# Patient Record
Sex: MALE | Race: BLACK OR AFRICAN AMERICAN | Employment: UNEMPLOYED | URBAN - METROPOLITAN AREA
[De-identification: names, ages, dates, MRNs, and addresses within clinical notes are randomized per-mention and may not be internally consistent; named-entity substitution may affect disease eponyms.]

---

## 2017-06-10 ENCOUNTER — HOSPITAL ENCOUNTER (EMERGENCY)
Age: 27
Discharge: HOME | End: 2017-06-10
Attending: EMERGENCY MEDICINE
Payer: SELF-PAY

## 2017-06-10 ENCOUNTER — APPOINTMENT (OUTPATIENT)
Dept: GENERAL RADIOLOGY | Age: 27
End: 2017-06-10
Attending: EMERGENCY MEDICINE
Payer: SELF-PAY

## 2017-06-10 VITALS
DIASTOLIC BLOOD PRESSURE: 79 MMHG | HEIGHT: 66 IN | BODY MASS INDEX: 22.5 KG/M2 | SYSTOLIC BLOOD PRESSURE: 110 MMHG | OXYGEN SATURATION: 100 % | RESPIRATION RATE: 16 BRPM | WEIGHT: 140 LBS | TEMPERATURE: 99.3 F | HEART RATE: 97 BPM

## 2017-06-10 DIAGNOSIS — S93.402A SPRAIN OF LEFT ANKLE, UNSPECIFIED LIGAMENT, INITIAL ENCOUNTER: Primary | ICD-10-CM

## 2017-06-10 PROCEDURE — 99283 EMERGENCY DEPT VISIT LOW MDM: CPT | Performed by: PHYSICIAN ASSISTANT

## 2017-06-10 PROCEDURE — 73610 X-RAY EXAM OF ANKLE: CPT

## 2017-06-10 PROCEDURE — 75810000053 HC SPLINT APPLICATION: Performed by: PHYSICIAN ASSISTANT

## 2017-06-10 PROCEDURE — L4350 ANKLE CONTROL ORTHO PRE OTS: HCPCS

## 2017-06-10 RX ORDER — IBUPROFEN 600 MG/1
600 TABLET ORAL
Qty: 20 TAB | Refills: 0 | Status: SHIPPED | OUTPATIENT
Start: 2017-06-10

## 2017-06-10 NOTE — Clinical Note
Emergency Department care is no substitute for quality primary care. The Emergency Department cannot adequately manage chronic health problems such as hypertension, arthritis, chronic pain, lung disease, heart disease, diabetes, etc. We cannot be expecte d to screen for all possible illnesses in a single visit. It is vital, regardless of your condition, that you have regular, routine health care. If your problem does not improve as expected and you are not able to find follow up care, the Emergency D epartment is always available to re-evaluate your condition. However, Emergency Department physicians will NOT refill pain medications, sedatives, or anxiety medications. You need a family doctor, it will be your responsibility to contact the physici ildefonso's office and arrange an appointment. Or you may call [phone], extension [phone] and ask for assistance finding a local doctor. This physician might not participate in your insurance plan. Resources include these local clinics:  
 
[facility] [phone] [address] [facility] [phone] Estevan Augustine [place] [address] [facility] [place] [phone] [employer] [phone] [facility]  [phone] Rule for [address] [place] [address] [phone] Medicaid Office [phone] [facility] [phone] [facility]1 [facility] [phone] [employer] [phone] . Saeid Larsen [address] Moreira [phone] [facility]6 [facility] [phone] [phone] [facility] CHEMICAL DEPENDENCY RECOVERY [facility]) [phone] 1606 [facility] [phone] [facility] [phone] 4500 [facility] [phone] Yasmeen [place] [address] [phone] 6501 [facility] [phone]

## 2017-06-10 NOTE — ED TRIAGE NOTES
Pt arrives complaining of left ankle pain after a fall while playing basketball. States he thinks it's sprained. Pt alert and oriented in triage.

## 2017-06-10 NOTE — LETTER
NOTIFICATION RETURN TO WORK / SCHOOL 
 
6/10/2017 8:38 PM 
 
Mr. Suzie Marsahll [address] [facility] [address]sh[address] [phone] To Whom It May Concern: 
 
Suzie Marshall is currently under the care of [facility] EMERGENCY DEPT.  
 
 
 
 
 
Sincerely,

## 2017-06-11 NOTE — ED NOTES
Air cast applied to left foot. Patient tolerated procedure well and demonstrated how to add and remove air from splint. Patient provided crutches with instructions on how to use. Patient states \"that he feels much better now. \"

## 2017-06-11 NOTE — ED NOTES
I have reviewed discharge instructions with the patient. The patient verbalized understanding. The patient is ambulatory upon exit with splint applied and crutches being used correctly. The patient has discharge instructions and prescription in hand.

## 2017-06-11 NOTE — ED PROVIDER NOTES
HPI Comments: 82-year-old -American male presents ambulatory stating that 2-3 hours ago he was playing basketball jumped up and landed twisting his left ankle. He states he is able to bear weight but it is painful. He denies any paresthesias, weakness or loss of function. Patient is a 27 y.o. male presenting with ankle pain. The history is provided by the patient. Ankle Pain    This is a new problem. The current episode started 1 to 2 hours ago. The problem occurs constantly. The problem has not changed since onset. The pain is present in the left ankle. The pain is at a severity of 10/10. Associated symptoms include limited range of motion and stiffness. Pertinent negatives include no numbness, no tingling and no back pain. The symptoms are aggravated by movement, palpation and activity. He has tried nothing for the symptoms. There has been no history of extremity trauma. History reviewed. No pertinent past medical history. History reviewed. No pertinent surgical history. History reviewed. No pertinent family history. Social History     Social History    Marital status: SINGLE     Spouse name: N/A    Number of children: N/A    Years of education: N/A     Occupational History    Not on file. Social History Main Topics    Smoking status: Not on file    Smokeless tobacco: Not on file    Alcohol use Not on file    Drug use: Not on file    Sexual activity: Not on file     Other Topics Concern    Not on file     Social History Narrative    No narrative on file         ALLERGIES: Review of patient's allergies indicates no known allergies. Review of Systems   Constitutional: Negative for chills, diaphoresis, fatigue and fever. Gastrointestinal: Negative for nausea and vomiting. Musculoskeletal: Positive for arthralgias, gait problem, joint swelling and stiffness. Negative for back pain and myalgias. Neurological: Negative for tingling and numbness.    All other systems reviewed and are negative. Vitals:    06/10/17 1901   BP: 110/79   Pulse: 97   Resp: 16   Temp: 99.3 °F (37.4 °C)   SpO2: 100%   Weight: 63.5 kg (140 lb)   Height: 5' 6\" (1.676 m)            Physical Exam   Constitutional: He is oriented to person, place, and time. Vital signs are normal. He appears well-developed and well-nourished. He is active. Non-toxic appearance. He does not appear ill. No distress. Cardiovascular: Normal rate, regular rhythm and normal heart sounds. Exam reveals no gallop and no friction rub. No murmur heard. Pulses:       Dorsalis pedis pulses are 2+ on the left side. Posterior tibial pulses are 2+ on the left side. Pulmonary/Chest: Effort normal and breath sounds normal. No accessory muscle usage. No respiratory distress. He has no decreased breath sounds. He has no wheezes. He has no rhonchi. Musculoskeletal:        Left knee: Normal. He exhibits normal range of motion. No tenderness found. Left ankle: He exhibits decreased range of motion and swelling. He exhibits no ecchymosis, no deformity and normal pulse. Tenderness. Lateral malleolus and AITFL tenderness found. No head of 5th metatarsal and no proximal fibula tenderness found. Left foot: Normal. There is normal capillary refill. Neurological: He is alert and oriented to person, place, and time. He has normal strength. No sensory deficit. Skin: Skin is warm and dry. Psychiatric: He has a normal mood and affect. His behavior is normal.   Nursing note and vitals reviewed. MDM  Number of Diagnoses or Management Options  Sprain of left ankle, unspecified ligament, initial encounter: new and requires workup  Diagnosis management comments: Patient with left ankle sprain. Air splint is applied. Crutches are given. Patient is prescribed ibuprofen.        Amount and/or Complexity of Data Reviewed  Tests in the radiology section of CPT®: ordered and reviewed    Risk of Complications, Morbidity, and/or Mortality  Presenting problems: low  Diagnostic procedures: low  Management options: low    Patient Progress  Patient progress: stable    ED Course       Procedures      XR ANKLE LT MIN 3 V   Final Result   IMPRESSION: Soft tissue swelling without evidence of acute bony abnormality. I discussed the results of all labs, procedures, radiographs, and treatments with the patient and available family. Treatment plan is agreed upon and the patient is ready for discharge. Questions about treatment in the ED and differential diagnosis of presenting condition were answered. Patient was given verbal discharge instructions including, but not limited to, importance of returning to the emergency department for any concern of worsening or continued symptoms. Instructions were given to follow up with a primary care provider or specialist within 1-2 days. Adverse effects of medications, if prescribed, were discussed and patient was advised to refrain from significant physical activity until followed up by primary care physician and to not drive or operate heavy machinery after taking any sedating substances.

## 2017-06-11 NOTE — DISCHARGE INSTRUCTIONS
Keep left foot elevated and apply ice packs to affected area as directed. Ankle Sprain: Care Instructions  Your Care Instructions    An ankle sprain can happen when you twist your ankle. The ligaments that support the ankle can get stretched and torn. Often the ankle is swollen and painful. Ankle sprains may take from several weeks to several months to heal. Usually, the more pain and swelling you have, the more severe your ankle sprain is and the longer it will take to heal. You can heal faster and regain strength in your ankle with good home treatment. It is very important to give your ankle time to heal completely, so that you do not easily hurt your ankle again. Follow-up care is a key part of your treatment and safety. Be sure to make and go to all appointments, and call your doctor if you are having problems. It's also a good idea to know your test results and keep a list of the medicines you take. How can you care for yourself at home? · Prop up your foot on pillows as much as possible for the next 3 days. Try to keep your ankle above the level of your heart. This will help reduce the swelling. · Follow your doctor's directions for wearing a splint or elastic bandage. Wrapping the ankle may help reduce or prevent swelling. · Your doctor may give you a splint, a brace, an air stirrup, or another form of ankle support to protect your ankle until it is healed. Wear it as directed while your ankle is healing. Do not remove it unless your doctor tells you to. After your ankle has healed, ask your doctor whether you should wear the brace when you exercise. · Put ice or cold packs on your injured ankle for 10 to 20 minutes at a time. Try to do this every 1 to 2 hours for the next 3 days (when you are awake) or until the swelling goes down. Put a thin cloth between the ice and your skin. · You may need to use crutches until you can walk without pain.  If you do use crutches, try to bear some weight on your injured ankle if you can do so without pain. This helps the ankle heal.  · Take pain medicines exactly as directed. ¨ If the doctor gave you a prescription medicine for pain, take it as prescribed. ¨ If you are not taking a prescription pain medicine, ask your doctor if you can take an over-the-counter medicine. · If you have been given ankle exercises to do at home, do them exactly as instructed. These can promote healing and help prevent lasting weakness. When should you call for help? Call your doctor now or seek immediate medical care if:  · Your pain is getting worse. · Your swelling is getting worse. · Your splint feels too tight or you are unable to loosen it. Watch closely for changes in your health, and be sure to contact your doctor if:  · You are not getting better after 1 week. Where can you learn more? Go to http://kendy-stephenie.info/. Enter S498 in the search box to learn more about \"Ankle Sprain: Care Instructions. \"  Current as of: May 23, 2016  Content Version: 11.2  © 7459-2238 [employer]. Care instructions adapted under license by [employer] (which disclaims liability or warranty for this information). If you have questions about a medical condition or this instruction, always ask your healthcare professional. [address] any warranty or liability for your use of this information. Learning About RICE (Rest, Ice, Compression, and Elevation)  What is RICE? RICE is a way to care for an injury. RICE helps relieve pain and swelling. It may also help with healing and flexibility. RICE stands for:  · Rest and protect the injured or sore area. · Ice or a cold pack used as soon as possible. · Compression, or wrapping the injured or sore area with an elastic bandage. · Elevation (propping up) the injured or sore area. How do you do RICE?   You can use RICE for home treatment when you have general aches and pains or after an injury or surgery. Rest  · Do not put weight on the injury for at least 24 to 48 hours. · Use crutches for a badly sprained knee or ankle. · Support a sprained wrist, elbow, or shoulder with a sling. Ice  · Put ice or a cold pack on the injury right away to reduce pain and swelling. Frozen vegetables will also work as an ice pack. Put a thin cloth between the ice or cold pack and your skin. The cloth protects the injured area from getting too cold. · Use ice for 10 to 15 minutes at a time for the first 48 to 72 hours. Compression  · Use compression for sprains, strains, and surgeries of the arms and legs. · Wrap the injured area with an elastic bandage or compression sleeve to reduce swelling. · Don't wrap it too tightly. If the area below it feels numb, tingles, or feels cool, loosen the wrap. Elevation  · Use elevation for areas of the body that can be propped up, such as arms and legs. · Prop up the injured area on pillows whenever you use ice. Keep it propped up anytime you sit or lie down. · Try to keep the injured area at or above the level of your heart. This will help reduce swelling and bruising. Where can you learn more? Go to http://kendy-stephenie.info/. Enter Y954 in the search box to learn more about \"Learning About RICE (Rest, Ice, Compression, and Elevation). \"  Current as of: May 23, 2016  Content Version: 11.2  © 8299-5849 [employer]. Care instructions adapted under license by [employer] (which disclaims liability or warranty for this information). If you have questions about a medical condition or this instruction, always ask your healthcare professional. [place] [address] any warranty or liability for your use of this information.

## 2022-09-18 ENCOUNTER — HOSPITAL ENCOUNTER (EMERGENCY)
Age: 32
Discharge: HOME | End: 2022-09-18
Attending: GENERAL PRACTICE | Admitting: GENERAL PRACTICE

## 2022-09-18 VITALS
HEIGHT: 66 IN | BODY MASS INDEX: 22.18 KG/M2 | HEART RATE: 115 BPM | DIASTOLIC BLOOD PRESSURE: 78 MMHG | SYSTOLIC BLOOD PRESSURE: 137 MMHG | OXYGEN SATURATION: 99 % | WEIGHT: 138 LBS | RESPIRATION RATE: 17 BRPM | TEMPERATURE: 98.9 F

## 2022-09-18 DIAGNOSIS — Z20.2 EXPOSURE TO STD: Primary | ICD-10-CM

## 2022-09-18 PROCEDURE — 99283 EMERGENCY DEPT VISIT LOW MDM: CPT

## 2022-09-18 PROCEDURE — 6370000000 HC RX 637 (ALT 250 FOR IP): Performed by: NURSE PRACTITIONER

## 2022-09-18 PROCEDURE — 87591 N.GONORRHOEAE DNA AMP PROB: CPT

## 2022-09-18 RX ORDER — METRONIDAZOLE 500 MG/1
2000 TABLET ORAL ONCE
Status: COMPLETED | OUTPATIENT
Start: 2022-09-18 | End: 2022-09-18

## 2022-09-18 RX ADMIN — METRONIDAZOLE 2000 MG: 500 TABLET ORAL at 17:04

## 2022-09-18 ASSESSMENT — PAIN - FUNCTIONAL ASSESSMENT: PAIN_FUNCTIONAL_ASSESSMENT: NONE - DENIES PAIN

## 2022-09-18 ASSESSMENT — ENCOUNTER SYMPTOMS
EYES NEGATIVE: 1
BACK PAIN: 0
NAUSEA: 0
ABDOMINAL PAIN: 0
SHORTNESS OF BREATH: 0
DIARRHEA: 0

## 2022-09-18 NOTE — ED PROVIDER NOTES
Emergency Department Provider Note                   PCP:                None Provider               Age: 36 y.o. Sex: male       ICD-10-CM    1. Exposure to STD  Z20.2           DISPOSITION Decision To Discharge 09/18/2022 04:47:35 PM       [facility]   Sushant Tran is a 36 y.o. male who presents to the Emergency Department with chief complaint of STD concern. Patient had recent exposure to trichomonas. Denies any symptoms. We will treat with Flagyl 2 g based on antibiotic recommendations. GC/chlamydia pending and will notify patient if any results positive. He has no other symptoms including abdominal pain or urinary symptoms. Instructed to avoid alcohol use for the next 1 to 2 days and avoid sexual contact for the next 7 days. Strict return precautions given. Safe for discharge at this time. Zoran Push, FNP-C, ENP-C  5:15 PM            Orders Placed This Encounter   Procedures    Chlamydia, Gonorrhea, Trichomoniasis Swab        Medications   metroNIDAZOLE (FLAGYL) tablet 2,000 mg (2,000 mg Oral Given 9/18/22 1704)       There are no discharge medications for this patient. Sushant Tran is a 36 y.o. male who presents to the Emergency Department with chief complaint of STD concern. Chief Complaint   Patient presents with    Exposure to STD      HPI  Camron Rivera is a 53-year-old male with no significant medical history, presenting to the ED for evaluation of STD exposure. He was told by his sexual partner that he has been exposed to trichomonas. He is here to receive treatment. He denies any urinary symptoms, penile discharge, testicular pain or scrotal swelling. No changes to his bowel pattern and no abdominal pain. Denies fever. No lesions. All other systems reviewed and are negative unless otherwise stated in the history of present illness section. Review of Systems   Constitutional:  Negative for fever. HENT: Negative. Eyes: Negative.     Respiratory:  Negative for shortness of breath. Cardiovascular:  Negative for chest pain. Gastrointestinal:  Negative for abdominal pain, diarrhea and nausea. Musculoskeletal:  Negative for arthralgias and back pain. Neurological:  Negative for headaches. All other systems reviewed and are negative. No past medical history on file. No past surgical history on file. No family history on file. Social History     Socioeconomic History    Marital status: Single        Allergies: Patient has no known allergies. There are no discharge medications for this patient. Vitals signs and nursing note reviewed. Patient Vitals for the past 4 hrs:   Temp Pulse Resp BP SpO2   09/18/22 1535 98.9 °F (37.2 °C) (!) 115 17 137/78 99 %          Physical Exam  Vitals and nursing note reviewed. Constitutional:       Appearance: He is not ill-appearing. HENT:      Mouth/Throat:      Mouth: Mucous membranes are moist.      Pharynx: Oropharynx is clear. Cardiovascular:      Rate and Rhythm: Normal rate. Pulmonary:      Effort: Pulmonary effort is normal.      Breath sounds: Normal breath sounds. Comments: Respirations even and unlabored  Abdominal:      General: Abdomen is flat. Palpations: Abdomen is soft. Genitourinary:     Comments: Deferred, asymptomatic  Skin:     General: Skin is warm and dry. Psychiatric:         Mood and Affect: Mood normal.        Procedures      No orders to display                         Voice dictation software was used during the making of this note. This software is not perfect and grammatical and other typographical errors may be present. This note has not been completely proofread for errors.         Jackquelyn Barthel, APRN - NP  09/18/22 2690

## 2022-09-18 NOTE — ED TRIAGE NOTES
Pt states he was exposed to trichomonas and wants to be tested. Pt denies urinary pain, denies penile discharge. Pt states he has been drinking alcohol, pt states \"I am intoxicated. \" Pt denies driving here to the ED, pt states he was dropped off.

## 2022-09-18 NOTE — DISCHARGE INSTRUCTIONS
Flagyl 2 g once is the treatment for trichomonas exposure. Do not take this medication with alcohol on board as it will make you violently ill. Avoid sexual contact for the next 7 days. Your STD testing will result in the next 24 to 48 hours. We will call you if any of the results are positive. You will also receive the results on [employer] when they return. Follow-up with the health department for any new STD concerns or return to the ED for new or worsening concerns also.

## 2022-09-22 LAB
C TRACH RRNA SPEC QL NAA+PROBE: NEGATIVE
N GONORRHOEA RRNA SPEC QL NAA+PROBE: NEGATIVE
SPECIMEN SOURCE: ABNORMAL
T VAGINALIS RRNA SPEC QL NAA+PROBE: POSITIVE

## 2022-09-23 NOTE — PROGRESS NOTES
ED pharmacist successfully contacted Nita Farnsworth on 09/23/22 regarding the recent results of their STI culture(s). The patient has been appropriately treated in the emergency department prior to discharge for the identified infection. Based on culture results, the patient has been adequately treated with existing antimicrobial therapy. The patient has been instructed to inform all anal, vaginal, and/or oral sex partners of the past 60 days since symptom onset of positive STI result(s). Nita Farnsworth has also been instructed to abstain from sexual encounters until after receiving treatment and symptoms have resolved. Patient has been educated to practice safe sex by using a condom during any sexual encounters. The patient has been advised to follow-up with their primary care provider or return to the ED if symptoms do not resolve or worsen. Allergies as of 09/18/2022    (No Known Allergies)     Thanh WattD.   Emergency Medicine Clinical Pharmacist